# Patient Record
Sex: FEMALE | Race: BLACK OR AFRICAN AMERICAN | Employment: STUDENT | URBAN - METROPOLITAN AREA
[De-identification: names, ages, dates, MRNs, and addresses within clinical notes are randomized per-mention and may not be internally consistent; named-entity substitution may affect disease eponyms.]

---

## 2017-08-19 ENCOUNTER — HOSPITAL ENCOUNTER (EMERGENCY)
Facility: HOSPITAL | Age: 22
Discharge: HOME | End: 2017-08-19
Attending: EMERGENCY MEDICINE
Payer: MEDICAID

## 2017-08-19 ENCOUNTER — APPOINTMENT (OUTPATIENT)
Dept: ULTRASOUND IMAGING | Facility: HOSPITAL | Age: 22
End: 2017-08-19
Attending: EMERGENCY MEDICINE
Payer: MEDICAID

## 2017-08-19 VITALS
WEIGHT: 185 LBS | HEIGHT: 60 IN | HEART RATE: 88 BPM | BODY MASS INDEX: 36.32 KG/M2 | RESPIRATION RATE: 17 BRPM | SYSTOLIC BLOOD PRESSURE: 117 MMHG | OXYGEN SATURATION: 100 % | DIASTOLIC BLOOD PRESSURE: 71 MMHG | TEMPERATURE: 98 F

## 2017-08-19 DIAGNOSIS — R51.9 NONINTRACTABLE EPISODIC HEADACHE, UNSPECIFIED HEADACHE TYPE: ICD-10-CM

## 2017-08-19 DIAGNOSIS — Z3A.01 LESS THAN 8 WEEKS GESTATION OF PREGNANCY: Primary | ICD-10-CM

## 2017-08-19 LAB
ALBUMIN SERPL-MCNC: 3.4 G/DL (ref 3.5–4.8)
ALP LIVER SERPL-CCNC: 83 U/L (ref 52–144)
ALT SERPL-CCNC: 18 U/L (ref 14–54)
AST SERPL-CCNC: 10 U/L (ref 15–41)
BASOPHILS # BLD AUTO: 0.05 X10(3) UL (ref 0–0.1)
BASOPHILS NFR BLD AUTO: 0.6 %
BILIRUB SERPL-MCNC: 0.2 MG/DL (ref 0.1–2)
BILIRUB UR QL STRIP.AUTO: NEGATIVE
BUN BLD-MCNC: 7 MG/DL (ref 8–20)
CALCIUM BLD-MCNC: 9.3 MG/DL (ref 8.3–10.3)
CHLORIDE: 108 MMOL/L (ref 101–111)
CLARITY UR REFRACT.AUTO: CLEAR
CO2: 25 MMOL/L (ref 22–32)
COLOR UR AUTO: YELLOW
CREAT BLD-MCNC: 0.62 MG/DL (ref 0.55–1.02)
EOSINOPHIL # BLD AUTO: 0.17 X10(3) UL (ref 0–0.3)
EOSINOPHIL NFR BLD AUTO: 2 %
ERYTHROCYTE [DISTWIDTH] IN BLOOD BY AUTOMATED COUNT: 15.1 % (ref 11.5–16)
GLUCOSE BLD-MCNC: 78 MG/DL (ref 70–99)
GLUCOSE UR STRIP.AUTO-MCNC: NEGATIVE MG/DL
HCG QUANTITATIVE: 4652 MIU/ML (ref ?–1)
HCT VFR BLD AUTO: 33.6 % (ref 34–50)
HGB BLD-MCNC: 10.8 G/DL (ref 12–16)
IMMATURE GRANULOCYTE COUNT: 0.03 X10(3) UL (ref 0–1)
IMMATURE GRANULOCYTE RATIO %: 0.4 %
KETONES UR STRIP.AUTO-MCNC: NEGATIVE MG/DL
LYMPHOCYTES # BLD AUTO: 2.6 X10(3) UL (ref 0.9–4)
LYMPHOCYTES NFR BLD AUTO: 30.7 %
M PROTEIN MFR SERPL ELPH: 7.3 G/DL (ref 6.1–8.3)
MCH RBC QN AUTO: 26.2 PG (ref 27–33.2)
MCHC RBC AUTO-ENTMCNC: 32.1 G/DL (ref 31–37)
MCV RBC AUTO: 81.6 FL (ref 81–100)
MONOCYTES # BLD AUTO: 0.71 X10(3) UL (ref 0.1–0.6)
MONOCYTES NFR BLD AUTO: 8.4 %
NEUTROPHIL ABS PRELIM: 4.92 X10 (3) UL (ref 1.3–6.7)
NEUTROPHILS # BLD AUTO: 4.92 X10(3) UL (ref 1.3–6.7)
NEUTROPHILS NFR BLD AUTO: 57.9 %
NITRITE UR QL STRIP.AUTO: NEGATIVE
PH UR STRIP.AUTO: 5 [PH] (ref 4.5–8)
PLATELET # BLD AUTO: 362 10(3)UL (ref 150–450)
POCT LOT NUMBER: NORMAL
POCT URINE PREGNANCY: POSITIVE
POTASSIUM SERPL-SCNC: 3.7 MMOL/L (ref 3.6–5.1)
PROT UR STRIP.AUTO-MCNC: NEGATIVE MG/DL
RBC # BLD AUTO: 4.12 X10(6)UL (ref 3.8–5.1)
RBC UR QL AUTO: NEGATIVE
RED CELL DISTRIBUTION WIDTH-SD: 44.4 FL (ref 35.1–46.3)
RH BLOOD TYPE: POSITIVE
SODIUM SERPL-SCNC: 141 MMOL/L (ref 136–144)
SP GR UR STRIP.AUTO: 1.03 (ref 1–1.03)
UROBILINOGEN UR STRIP.AUTO-MCNC: <2 MG/DL
WBC # BLD AUTO: 8.5 X10(3) UL (ref 4–13)

## 2017-08-19 PROCEDURE — 80053 COMPREHEN METABOLIC PANEL: CPT | Performed by: EMERGENCY MEDICINE

## 2017-08-19 PROCEDURE — 87086 URINE CULTURE/COLONY COUNT: CPT | Performed by: EMERGENCY MEDICINE

## 2017-08-19 PROCEDURE — 86901 BLOOD TYPING SEROLOGIC RH(D): CPT | Performed by: EMERGENCY MEDICINE

## 2017-08-19 PROCEDURE — 99284 EMERGENCY DEPT VISIT MOD MDM: CPT

## 2017-08-19 PROCEDURE — 85025 COMPLETE CBC W/AUTO DIFF WBC: CPT | Performed by: EMERGENCY MEDICINE

## 2017-08-19 PROCEDURE — 86900 BLOOD TYPING SEROLOGIC ABO: CPT | Performed by: EMERGENCY MEDICINE

## 2017-08-19 PROCEDURE — 84702 CHORIONIC GONADOTROPIN TEST: CPT | Performed by: EMERGENCY MEDICINE

## 2017-08-19 PROCEDURE — 76817 TRANSVAGINAL US OBSTETRIC: CPT | Performed by: EMERGENCY MEDICINE

## 2017-08-19 PROCEDURE — 81001 URINALYSIS AUTO W/SCOPE: CPT | Performed by: EMERGENCY MEDICINE

## 2017-08-19 PROCEDURE — 96360 HYDRATION IV INFUSION INIT: CPT

## 2017-08-19 PROCEDURE — 76801 OB US < 14 WKS SINGLE FETUS: CPT | Performed by: EMERGENCY MEDICINE

## 2017-08-19 PROCEDURE — 81025 URINE PREGNANCY TEST: CPT

## 2017-08-19 RX ORDER — ACETAMINOPHEN 500 MG
1000 TABLET ORAL ONCE
Status: COMPLETED | OUTPATIENT
Start: 2017-08-19 | End: 2017-08-19

## 2017-08-19 NOTE — ED INITIAL ASSESSMENT (HPI)
Pt has had a ongoing headache, nausea, 1 episode of diarrhea, generalized abd pain - started 2 weeks ago  Pt states she hasn't taken pain meds for headache    Pt took home preg test and it came back yest - it was positive  Denies vaginal bleeding

## 2017-08-20 NOTE — ED NOTES
Pt received 800ml water at bs to drink - tolerating well and instructed to call with her full bladder.

## 2017-08-20 NOTE — ED PROVIDER NOTES
Patient Seen in: [facility] Emergency Department    History   Patient presents with:  Headache (neurologic)  Abdomen/Flank Pain (GI/)    Stated Complaint: Abdominal pain    HPI    4year-old female, [id number] resents emergency room with chief compla agreed except as otherwise stated in HPI.     Physical Exam   ED Triage Vitals  BP: 114/71 [08/19/17 1846]  Pulse: 106 [08/19/17 1846]  Resp: 18 [08/19/17 1846]  Temp: 98.1 °F (36.7 °C) [08/19/17 1846]  Temp src: Temporal [08/19/17 1846]  SpO2: 99 % [08/19/ Result Value    Leukocyte Esterase Urine Large (*)     WBC Urine 5-10 (*)     Squamous Epi.  Cells Large (*)     Mucous Urine 2+ (*)     All other components within normal limits   COMP METABOLIC PANEL (14) - Abnormal; Notable for the following:     BUN type    Disposition:  Discharge    Follow-up:  Dacia Hale MD  [address] [id number]    Schedule an appointment as soon as possible for a visit in 2 days        Medications Prescribed:  Current Discharge Medication

## 2017-09-17 ENCOUNTER — APPOINTMENT (OUTPATIENT)
Dept: ULTRASOUND IMAGING | Age: 22
End: 2017-09-17
Attending: PHYSICIAN ASSISTANT
Payer: MEDICAID

## 2017-09-17 ENCOUNTER — HOSPITAL ENCOUNTER (EMERGENCY)
Age: 22
Discharge: HOME | End: 2017-09-17
Attending: EMERGENCY MEDICINE
Payer: MEDICAID

## 2017-09-17 VITALS
WEIGHT: 195 LBS | HEIGHT: 64 IN | OXYGEN SATURATION: 96 % | TEMPERATURE: 99 F | SYSTOLIC BLOOD PRESSURE: 110 MMHG | DIASTOLIC BLOOD PRESSURE: 66 MMHG | BODY MASS INDEX: 33.29 KG/M2 | RESPIRATION RATE: 16 BRPM | HEART RATE: 71 BPM

## 2017-09-17 DIAGNOSIS — O20.9 VAGINAL BLEEDING IN PREGNANCY, FIRST TRIMESTER: Primary | ICD-10-CM

## 2017-09-17 LAB
BASOPHILS # BLD AUTO: 0.04 X10(3) UL (ref 0–0.1)
BASOPHILS NFR BLD AUTO: 0.5 %
EOSINOPHIL # BLD AUTO: 0.12 X10(3) UL (ref 0–0.3)
EOSINOPHIL NFR BLD AUTO: 1.4 %
ERYTHROCYTE [DISTWIDTH] IN BLOOD BY AUTOMATED COUNT: 14.4 % (ref 11.5–16)
HCG QUANTITATIVE: ABNORMAL MIU/ML (ref ?–1)
HCT VFR BLD AUTO: 34.1 % (ref 34–50)
HGB BLD-MCNC: 11.2 G/DL (ref 12–16)
IMMATURE GRANULOCYTE COUNT: 0.02 X10(3) UL (ref 0–1)
IMMATURE GRANULOCYTE RATIO %: 0.2 %
LYMPHOCYTES # BLD AUTO: 2.53 X10(3) UL (ref 0.9–4)
LYMPHOCYTES NFR BLD AUTO: 28.6 %
MCH RBC QN AUTO: 26.7 PG (ref 27–33.2)
MCHC RBC AUTO-ENTMCNC: 32.8 G/DL (ref 31–37)
MCV RBC AUTO: 81.2 FL (ref 81–100)
MONOCYTES # BLD AUTO: 0.51 X10(3) UL (ref 0.1–0.6)
MONOCYTES NFR BLD AUTO: 5.8 %
NEUTROPHIL ABS PRELIM: 5.62 X10 (3) UL (ref 1.3–6.7)
NEUTROPHILS # BLD AUTO: 5.62 X10(3) UL (ref 1.3–6.7)
NEUTROPHILS NFR BLD AUTO: 63.5 %
PLATELET # BLD AUTO: 379 10(3)UL (ref 150–450)
RBC # BLD AUTO: 4.2 X10(6)UL (ref 3.8–5.1)
RED CELL DISTRIBUTION WIDTH-SD: 42.2 FL (ref 35.1–46.3)
RH BLOOD TYPE: POSITIVE
WBC # BLD AUTO: 8.8 X10(3) UL (ref 4–13)

## 2017-09-17 PROCEDURE — 86901 BLOOD TYPING SEROLOGIC RH(D): CPT | Performed by: EMERGENCY MEDICINE

## 2017-09-17 PROCEDURE — 84702 CHORIONIC GONADOTROPIN TEST: CPT | Performed by: PHYSICIAN ASSISTANT

## 2017-09-17 PROCEDURE — 84702 CHORIONIC GONADOTROPIN TEST: CPT | Performed by: EMERGENCY MEDICINE

## 2017-09-17 PROCEDURE — 96360 HYDRATION IV INFUSION INIT: CPT

## 2017-09-17 PROCEDURE — 76801 OB US < 14 WKS SINGLE FETUS: CPT | Performed by: EMERGENCY MEDICINE

## 2017-09-17 PROCEDURE — 86900 BLOOD TYPING SEROLOGIC ABO: CPT | Performed by: EMERGENCY MEDICINE

## 2017-09-17 PROCEDURE — 85025 COMPLETE CBC W/AUTO DIFF WBC: CPT | Performed by: EMERGENCY MEDICINE

## 2017-09-17 PROCEDURE — 85025 COMPLETE CBC W/AUTO DIFF WBC: CPT | Performed by: PHYSICIAN ASSISTANT

## 2017-09-17 PROCEDURE — 99284 EMERGENCY DEPT VISIT MOD MDM: CPT

## 2017-09-18 NOTE — ED PROVIDER NOTES
Patient Seen in: [facility] In [place]    History   Patient presents with:  Eval-G (gynecologic)    Stated Complaint: Eval-G    HPI        Past Medical History:   Diagnosis Date   • Asthma        Past Surgical History:  No date: ROCÍO Abnormal            Final result                 Please view results for these tests on the individual orders.    ABORH (BLOOD TYPE)       ============================================================  ED Course  -------------------------------------

## 2017-09-18 NOTE — ED INITIAL ASSESSMENT (HPI)
Pt to ed co bright red vaginal bleeding noted earlier today pt has co mild cramps lower right side states she is 9 weeks 2 days preg last US done at [facility] on last week

## 2017-09-18 NOTE — ED PROVIDER NOTES
Patient Seen in: [facility] In [facility]    History   Patient presents with:  Eval-G (gynecologic)    Stated Complaint: Eval-G    AUGUSTUS Rodrigues is a 36-year-old female who presents today for evaluation of vaginal bleeding and pregnancy (Oral)   Resp 16   Ht 162.6 cm (5' 4\")   Wt 88.5 kg   LMP 07/03/2017   SpO2 96%   BMI 33.47 kg/m²         Physical Exam   Constitutional: She is oriented to person, place, and time. She appears well-developed and well-nourished. No distress.    HENT:   Naeem Michael W/ DIFFERENTIAL[[id number]]          Abnormal            Final result                 Please view results for these tests on the individual orders.    ABORH (BLOOD TYPE)       ============================================================  ED Course  --------- and normal appearing. YOLK SAC:  Not identified CARDIAC ACTIVITY:  Cardiac activity is identified at 171 bpm. UTERUS:  Normal.  RIGHT OVARY:  There is a simple appearing right ovarian cyst measuring 2.6 cm.  LEFT OVARY:  Normal. CUL-DE-SAC:  Normal. CLINICA

## 2017-09-21 ENCOUNTER — APPOINTMENT (OUTPATIENT)
Dept: ULTRASOUND IMAGING | Age: 22
End: 2017-09-21
Attending: EMERGENCY MEDICINE
Payer: MEDICAID

## 2017-09-21 ENCOUNTER — HOSPITAL ENCOUNTER (EMERGENCY)
Age: 22
Discharge: HOME | End: 2017-09-21
Attending: EMERGENCY MEDICINE
Payer: MEDICAID

## 2017-09-21 VITALS
HEIGHT: 64 IN | BODY MASS INDEX: 32.44 KG/M2 | RESPIRATION RATE: 18 BRPM | WEIGHT: 190 LBS | TEMPERATURE: 98 F | OXYGEN SATURATION: 99 % | DIASTOLIC BLOOD PRESSURE: 62 MMHG | HEART RATE: 94 BPM | SYSTOLIC BLOOD PRESSURE: 129 MMHG

## 2017-09-21 DIAGNOSIS — O20.9 VAGINAL BLEEDING IN PREGNANCY, FIRST TRIMESTER: Primary | ICD-10-CM

## 2017-09-21 LAB
BASOPHILS # BLD AUTO: 0.05 X10(3) UL (ref 0–0.1)
BASOPHILS NFR BLD AUTO: 0.6 %
EOSINOPHIL # BLD AUTO: 0.14 X10(3) UL (ref 0–0.3)
EOSINOPHIL NFR BLD AUTO: 1.6 %
ERYTHROCYTE [DISTWIDTH] IN BLOOD BY AUTOMATED COUNT: 14.2 % (ref 11.5–16)
HCG QUANTITATIVE: ABNORMAL MIU/ML (ref ?–1)
HCT VFR BLD AUTO: 31.4 % (ref 34–50)
HGB BLD-MCNC: 10.3 G/DL (ref 12–16)
IMMATURE GRANULOCYTE COUNT: 0.03 X10(3) UL (ref 0–1)
IMMATURE GRANULOCYTE RATIO %: 0.4 %
LYMPHOCYTES # BLD AUTO: 2.82 X10(3) UL (ref 0.9–4)
LYMPHOCYTES NFR BLD AUTO: 33 %
MCH RBC QN AUTO: 26.7 PG (ref 27–33.2)
MCHC RBC AUTO-ENTMCNC: 32.8 G/DL (ref 31–37)
MCV RBC AUTO: 81.3 FL (ref 81–100)
MONOCYTES # BLD AUTO: 0.53 X10(3) UL (ref 0.1–0.6)
MONOCYTES NFR BLD AUTO: 6.2 %
NEUTROPHIL ABS PRELIM: 4.97 X10 (3) UL (ref 1.3–6.7)
NEUTROPHILS # BLD AUTO: 4.97 X10(3) UL (ref 1.3–6.7)
NEUTROPHILS NFR BLD AUTO: 58.2 %
PLATELET # BLD AUTO: 351 10(3)UL (ref 150–450)
RBC # BLD AUTO: 3.86 X10(6)UL (ref 3.8–5.1)
RED CELL DISTRIBUTION WIDTH-SD: 41.8 FL (ref 35.1–46.3)
WBC # BLD AUTO: 8.5 X10(3) UL (ref 4–13)

## 2017-09-21 PROCEDURE — 99284 EMERGENCY DEPT VISIT MOD MDM: CPT

## 2017-09-21 PROCEDURE — 36415 COLL VENOUS BLD VENIPUNCTURE: CPT

## 2017-09-21 PROCEDURE — 84702 CHORIONIC GONADOTROPIN TEST: CPT | Performed by: EMERGENCY MEDICINE

## 2017-09-21 PROCEDURE — 85025 COMPLETE CBC W/AUTO DIFF WBC: CPT | Performed by: EMERGENCY MEDICINE

## 2017-09-22 NOTE — ED PROVIDER NOTES
Patient Seen in: [facility] Emergency Department In [place]    History   Patient presents with:  Eval-G (gynecologic)    Stated Complaint: Vaginal Bleeding-Approximately 2 months pregnant    AUGUSTUS Davidsno is a 71-year-old female who presents for the seco LMP 07/03/2017   SpO2 99%   BMI 32.61 kg/m²         Physical Exam   Constitutional: She is oriented to person, place, and time. She appears well-developed and well-nourished. No distress. HENT:   Head: Normocephalic and atraumatic.    Cardiovascular: Norm tests on the individual orders.      This case is discussed with Dr. Yanelis Whiting who evaluates the patient, performs a bedside ultrasound which shows live IUP with heartbeat and movement, and agrees with the plan of care.   ========================================

## 2017-09-22 NOTE — ED PROVIDER NOTES
Patient was seen in the emergency department on September 17. She was having vaginal bleeding at that time.   Ultrasound identified single live intrauterine pregnancy at about 9 weeks 3 days with a  simple appearing right ovarian cyst    Patient complains

## 2017-10-17 ENCOUNTER — HOSPITAL ENCOUNTER (EMERGENCY)
Age: 22
Discharge: HOME | End: 2017-10-17
Attending: EMERGENCY MEDICINE
Payer: MEDICAID

## 2017-10-17 ENCOUNTER — APPOINTMENT (OUTPATIENT)
Dept: ULTRASOUND IMAGING | Age: 22
End: 2017-10-17
Attending: PHYSICIAN ASSISTANT
Payer: MEDICAID

## 2017-10-17 VITALS
RESPIRATION RATE: 18 BRPM | HEART RATE: 87 BPM | WEIGHT: 185 LBS | BODY MASS INDEX: 31.58 KG/M2 | DIASTOLIC BLOOD PRESSURE: 55 MMHG | SYSTOLIC BLOOD PRESSURE: 105 MMHG | HEIGHT: 64 IN | OXYGEN SATURATION: 99 %

## 2017-10-17 DIAGNOSIS — L73.9 FOLLICULITIS: Primary | ICD-10-CM

## 2017-10-17 DIAGNOSIS — O46.92 VAGINAL BLEEDING IN PREGNANCY, SECOND TRIMESTER: ICD-10-CM

## 2017-10-17 PROCEDURE — 81015 MICROSCOPIC EXAM OF URINE: CPT | Performed by: PHYSICIAN ASSISTANT

## 2017-10-17 PROCEDURE — 76801 OB US < 14 WKS SINGLE FETUS: CPT | Performed by: PHYSICIAN ASSISTANT

## 2017-10-17 PROCEDURE — 76817 TRANSVAGINAL US OBSTETRIC: CPT | Performed by: PHYSICIAN ASSISTANT

## 2017-10-17 PROCEDURE — 87086 URINE CULTURE/COLONY COUNT: CPT | Performed by: PHYSICIAN ASSISTANT

## 2017-10-17 PROCEDURE — 81001 URINALYSIS AUTO W/SCOPE: CPT | Performed by: PHYSICIAN ASSISTANT

## 2017-10-17 PROCEDURE — 99284 EMERGENCY DEPT VISIT MOD MDM: CPT

## 2017-10-17 RX ORDER — CEPHALEXIN 500 MG/1
500 CAPSULE ORAL 3 TIMES DAILY
Qty: 21 CAPSULE | Refills: 0 | Status: SHIPPED | OUTPATIENT
Start: 2017-10-17 | End: 2017-10-24

## 2017-10-17 RX ORDER — FAMOTIDINE 20 MG
TABLET ORAL
COMMUNITY
End: 2019-08-06

## 2017-10-18 NOTE — ED INITIAL ASSESSMENT (HPI)
Patient states that she is approximately 14 weeks pregnant and has had some discharge with odor. Patient also states that she has a rash on her abdomen that started last weekend.

## 2017-10-18 NOTE — ED INITIAL ASSESSMENT (HPI)
Rash on abdomen for the last 10 days. Pt also reports she is 14 weeks pregnant and has been having bleeding throughout the pregnancy. PT has been seen in the ED a month ago for the same thing. Has not yet followed up with OBGYN.

## 2017-10-18 NOTE — ED PROVIDER NOTES
I have personally reviewed the case with the PA as well as completed a HPI and agree with the care and treatment plan put forth  Patient's ultrasound is showing a live IUP at 13 weeks 6 days. Patient's rash consistent with folliculitis.   Patient be discha

## 2017-10-18 NOTE — ED PROVIDER NOTES
Patient Seen in: [facility] Emergency Department In [place]    History   Patient presents with:  Eval-G (gynecologic)  Rash Skin Problem (integumentary)    Stated Complaint: Vaginal Bleeding approximately 14 weeks pregnant with rash on abdomen    HPI    J Exam   ED Triage Vitals [10/17/17 1930]  BP: 121/68  Pulse: 91  Resp: 18  Temp: n/a  Temp src: Oral  SpO2: 100 %  O2 Device: None (Room air)    Current:/68   Pulse 91   Resp 18   Ht 162.6 cm (5' 4\")   Wt 83.9 kg   LMP 07/03/2017   SpO2 100%   BMI 31 today and remains so upon discharge. I discuss the plan of care with the patient, who expresses understanding. All questions and concerns are addressed to the patient's satisfaction prior to discharge today.     Disposition and Plan     Clinical Impressio

## 2017-12-20 ENCOUNTER — HOSPITAL ENCOUNTER (EMERGENCY)
Age: 22
Discharge: LEFT BEFORE DISCHARGE | End: 2017-12-20
Attending: EMERGENCY MEDICINE
Payer: MEDICAID

## 2017-12-20 VITALS
BODY MASS INDEX: 33.29 KG/M2 | TEMPERATURE: 98 F | RESPIRATION RATE: 16 BRPM | OXYGEN SATURATION: 100 % | HEART RATE: 91 BPM | WEIGHT: 195 LBS | HEIGHT: 64 IN | DIASTOLIC BLOOD PRESSURE: 48 MMHG | SYSTOLIC BLOOD PRESSURE: 101 MMHG

## 2017-12-20 DIAGNOSIS — O26.892 ABDOMINAL PAIN IN PREGNANCY, SECOND TRIMESTER: Primary | ICD-10-CM

## 2017-12-20 DIAGNOSIS — R10.9 ABDOMINAL PAIN IN PREGNANCY, SECOND TRIMESTER: Primary | ICD-10-CM

## 2017-12-20 PROCEDURE — 99283 EMERGENCY DEPT VISIT LOW MDM: CPT

## 2017-12-20 NOTE — ED PROVIDER NOTES
Patient Seen in: [facility] Emergency Department In [place]    History   Patient presents with:  Eval-G (gynecologic)    Stated Complaint: Abd pain- Eval G +preg    HPI    20-year-old female who is  2 para 1 and approximately 23 weeks pregnant pres Oropharynx is clear and moist.   Eyes: EOM are normal. Pupils are equal, round, and reactive to light. Neck: Normal range of motion. Neck supple. Cardiovascular: Normal rate, regular rhythm, normal heart sounds and intact distal pulses.     Pulmonary/Ch Patient understands and again states that she is not driving that far and does not want to travel that far. We offered to have her go by ambulance and she refused this as well.   She subsequently signed out [address]          Disposition and P

## 2017-12-20 NOTE — ED INITIAL ASSESSMENT (HPI)
Pt is 23 weeks pregnant. Sts she has been having constant abd pain x 1 month. Denies any vaginal bleeding.  LMP 7/24, KUNAL 4/16

## 2017-12-21 NOTE — ED NOTES
Spoke to patient about transferring to [facility] because [facility] is full. Pt states, \"I am not going to [facility] it is too far\". RN informed patient that she will be signing out AMA. Pt verbalized understanding. Dr Mortimer Riff spoke to patient also.

## 2017-12-21 NOTE — ED NOTES
DR. Tiki Cortez [facility]PO[facility] DR[facility]

## 2017-12-21 NOTE — CM/SW NOTE
Received call from [facility] that patient will need transfer. Patient has out of [employer] and needs transfer for admission. Patient is 23 weeks pregnant and is a patient of Dr. Brayan Mcdonnell from the [facility].   Called Dr. Abbe Chino at [phone] x [phone]

## 2017-12-21 NOTE — ED NOTES
RN encouraged patient to call OB tonight to try and see them in the am. Pt told RN \"I didn't call because I can't get through\" Pt dc by BRIANNA

## 2019-08-06 ENCOUNTER — OFFICE VISIT (OUTPATIENT)
Dept: FAMILY MEDICINE CLINIC | Facility: CLINIC | Age: 24
End: 2019-08-06
Payer: COMMERCIAL

## 2019-08-06 VITALS
BODY MASS INDEX: 37.17 KG/M2 | OXYGEN SATURATION: 98 % | WEIGHT: 225.81 LBS | SYSTOLIC BLOOD PRESSURE: 122 MMHG | HEART RATE: 81 BPM | RESPIRATION RATE: 18 BRPM | TEMPERATURE: 99 F | HEIGHT: 65.5 IN | DIASTOLIC BLOOD PRESSURE: 78 MMHG

## 2019-08-06 DIAGNOSIS — H10.022 OTHER MUCOPURULENT CONJUNCTIVITIS OF LEFT EYE: Primary | ICD-10-CM

## 2019-08-06 PROCEDURE — 99202 OFFICE O/P NEW SF 15 MIN: CPT | Performed by: NURSE PRACTITIONER

## 2019-08-06 RX ORDER — POLYMYXIN B SULFATE AND TRIMETHOPRIM 1; 10000 MG/ML; [USP'U]/ML
1 SOLUTION OPHTHALMIC EVERY 6 HOURS
Qty: 1 BOTTLE | Refills: 0 | Status: SHIPPED | OUTPATIENT
Start: 2019-08-06 | End: 2019-08-13

## 2019-08-06 NOTE — PROGRESS NOTES
CHIEF COMPLAINT:   Patient presents with:  Eye Problem: left sided, itching/pain/swelling/watering (exposure to pink eye) - Started early this morning      HPI:   Nay Dhaliwal is a 26year old female who presents with chief complaint of eye irritation. GENERAL: well developed, well nourished,in no apparent distress  SKIN: no rashes,no suspicious lesions  EYES: PERRLA, EOMI, left conjunctiva erythematous, injected, no active discharge.   HENT: atraumatic, normocephalic, TMs non injected, without bulging or The membrane that covers the white part of your eye (the conjunctiva) is inflamed. Inflammation happens when your body responds to an injury, allergic reaction, infection, or illness.  Symptoms of inflammation in the eye may include redness, irritation, itc

## 2024-03-03 ENCOUNTER — HOSPITAL ENCOUNTER (OUTPATIENT)
Age: 29
Discharge: ANOTHER HOSPITAL | End: 2024-03-03
Payer: MEDICAID

## 2024-03-03 VITALS
TEMPERATURE: 97 F | SYSTOLIC BLOOD PRESSURE: 109 MMHG | DIASTOLIC BLOOD PRESSURE: 57 MMHG | RESPIRATION RATE: 18 BRPM | OXYGEN SATURATION: 98 % | HEART RATE: 84 BPM

## 2024-03-03 DIAGNOSIS — R10.9 ABDOMINAL CRAMPING: Primary | ICD-10-CM

## 2024-03-03 DIAGNOSIS — Z32.01 POSITIVE PREGNANCY TEST (HCC): ICD-10-CM

## 2024-03-03 LAB
B-HCG UR QL: POSITIVE
BILIRUB UR QL STRIP: NEGATIVE
CLARITY UR: CLEAR
GLUCOSE UR STRIP-MCNC: NEGATIVE MG/DL
HGB UR QL STRIP: NEGATIVE
LEUKOCYTE ESTERASE UR QL STRIP: NEGATIVE
NITRITE UR QL STRIP: NEGATIVE
PH UR STRIP: 7 [PH]
PROT UR STRIP-MCNC: 30 MG/DL
SP GR UR STRIP: 1.02
UROBILINOGEN UR STRIP-ACNC: <2 MG/DL

## 2024-03-03 PROCEDURE — 81002 URINALYSIS NONAUTO W/O SCOPE: CPT

## 2024-03-03 PROCEDURE — 99203 OFFICE O/P NEW LOW 30 MIN: CPT

## 2024-03-03 PROCEDURE — 99202 OFFICE O/P NEW SF 15 MIN: CPT

## 2024-03-03 PROCEDURE — 81025 URINE PREGNANCY TEST: CPT

## 2024-03-03 NOTE — ED INITIAL ASSESSMENT (HPI)
Vaginal discharge with low abd cramps since last night, pt had + pregnancy test 2 weeks ago, no prenatal yet, no fever, no urinary symptoms

## 2024-03-03 NOTE — ED PROVIDER NOTES
Patient Seen in: [facility]      History     Chief Complaint   Patient presents with    Eval-G     Stated Complaint: Yeast Infection  Subjective:   28-year-old female presents for vaginal discharge, discomfort, and abdominal cramping.  She states this has been going on for the past few days.  Her last menses was .  She states she did take a home pregnancy test a couple weeks ago that was positive.  She has not sought out prenatal care yet.  She is a  4 para 2.  She is eating and drinking without vomiting or diarrhea.  She states she has a history of frequent BV infections.  No fevers.  She appears nontoxic.      HISTORY:  Past Medical History:   Diagnosis Date    Asthma (HCC)       Past Surgical History:   Procedure Laterality Date     DELIVERY ONLY        Family History   Problem Relation Age of Onset    Cancer Neg     Heart Disorder Neg       Social History     Socioeconomic History    Marital status: Single   Tobacco Use    Smoking status: Former     Types: Cigarettes    Smokeless tobacco: Former   Substance and Sexual Activity    Alcohol use: No           Review of Systems   Genitourinary:  Positive for pelvic pain, vaginal discharge and vaginal pain.   All other systems reviewed and are negative.      Positive for stated complaint: Yeast Infection  Other systems are as noted in HPI.  Constitutional and vital signs reviewed.      All other systems reviewed and negative except as noted above.    Physical Exam     ED Triage Vitals [24 0926]   /57   Pulse 84   Resp 18   Temp 96.8 °F (36 °C)   Temp src Temporal   SpO2 98 %   O2 Device      Current:/57   Pulse 84   Temp 96.8 °F (36 °C) (Temporal)   Resp 18   LMP 2023 (Exact Date)   SpO2 98%     Physical Exam  Vitals and nursing note reviewed.   Constitutional:       General: She is not in acute distress.     Appearance: Normal appearance. She is not toxic-appearing.   HENT:      Nose: Nose normal.       Mouth/Throat:      Mouth: Mucous membranes are moist.   Cardiovascular:      Rate and Rhythm: Normal rate and regular rhythm.   Pulmonary:      Effort: Pulmonary effort is normal.      Breath sounds: Normal breath sounds.   Abdominal:      General: There is no distension.      Palpations: Abdomen is soft. There is no mass.      Tenderness: There is abdominal tenderness. There is no right CVA tenderness or left CVA tenderness.   Musculoskeletal:         General: Normal range of motion.   Skin:     General: Skin is warm and dry.      Capillary Refill: Capillary refill takes less than 2 seconds.   Neurological:      General: No focal deficit present.      Mental Status: She is alert and oriented to person, place, and time.   Psychiatric:         Mood and Affect: Mood normal.         Behavior: Behavior normal.         ED Course     Labs Reviewed   POCT PREGNANCY URINE - Abnormal; Notable for the following components:       Result Value    POCT Urine Pregnancy Positive (*)     All other components within normal limits   [facility] POCT URINALYSIS DIPSTICK - Abnormal; Notable for the following components:    Protein urine 30 (*)     Ketone, Urine Trace (*)     All other components within normal limits         MDM       Medical Decision Making  Based on the patient having abdominal cramping with a positive pregnancy test, she needs a further workup in the emergency department.  She agrees with plan of care and will go to the emergency department at [place].    Amount and/or Complexity of Data Reviewed  Labs: ordered.     Details: Urine dip shows 30 of protein, trace ketones, and her pregnancy test is positive.  Discussion of management or test interpretation with external provider(s): I discussed this patient with Dr. Reid. He agrees with the plan of care.         Disposition and Plan     Clinical Impression:  1. Abdominal cramping    2. Positive pregnancy test (HCC)         Disposition:  Ic to ed  3/3/2024 10:03  am    Follow-up:  No follow-up provider specified.        Medications Prescribed:  There are no discharge medications for this patient.

## 2024-03-03 NOTE — ED QUICK NOTES
Seen and examined by jazzmine carlin, pt will go to ed for further eval and management,pt denies vaginal bleeding

## 2024-03-26 ENCOUNTER — OFFICE VISIT (OUTPATIENT)
Dept: FAMILY MEDICINE CLINIC | Facility: CLINIC | Age: 29
End: 2024-03-26
Payer: MEDICAID

## 2024-03-26 VITALS
TEMPERATURE: 97 F | OXYGEN SATURATION: 98 % | RESPIRATION RATE: 18 BRPM | HEIGHT: 64 IN | BODY MASS INDEX: 38.58 KG/M2 | SYSTOLIC BLOOD PRESSURE: 128 MMHG | WEIGHT: 226 LBS | HEART RATE: 101 BPM | DIASTOLIC BLOOD PRESSURE: 70 MMHG

## 2024-03-26 DIAGNOSIS — Z11.3 ROUTINE SCREENING FOR STI (SEXUALLY TRANSMITTED INFECTION): ICD-10-CM

## 2024-03-26 DIAGNOSIS — Z98.890 STATUS POST ELECTIVE ABORTION: ICD-10-CM

## 2024-03-26 DIAGNOSIS — N94.9 VAGINAL SYMPTOM: Primary | ICD-10-CM

## 2024-03-26 LAB
APPEARANCE: CLEAR
BILIRUBIN: NEGATIVE
CONTROL LINE PRESENT WITH A CLEAR BACKGROUND (YES/NO): YES YES/NO
GLUCOSE (URINE DIPSTICK): NEGATIVE MG/DL
KETONES (URINE DIPSTICK): NEGATIVE MG/DL
KIT LOT #: NORMAL NUMERIC
LEUKOCYTES: NEGATIVE
MULTISTIX LOT#: NORMAL NUMERIC
NITRITE, URINE: NEGATIVE
OCCULT BLOOD: NEGATIVE
PH, URINE: 6 (ref 4.5–8)
PROTEIN (URINE DIPSTICK): NEGATIVE MG/DL
SPECIFIC GRAVITY: 1.02 (ref 1–1.03)
URINE-COLOR: YELLOW
UROBILINOGEN,SEMI-QN: 0.2 MG/DL (ref 0–1.9)

## 2024-03-26 PROCEDURE — 81514 NFCT DS BV&VAGINITIS DNA ALG: CPT | Performed by: NURSE PRACTITIONER

## 2024-03-26 PROCEDURE — 87591 N.GONORRHOEAE DNA AMP PROB: CPT | Performed by: NURSE PRACTITIONER

## 2024-03-26 PROCEDURE — 81003 URINALYSIS AUTO W/O SCOPE: CPT | Performed by: NURSE PRACTITIONER

## 2024-03-26 PROCEDURE — 87491 CHLMYD TRACH DNA AMP PROBE: CPT | Performed by: NURSE PRACTITIONER

## 2024-03-26 PROCEDURE — 81025 URINE PREGNANCY TEST: CPT | Performed by: NURSE PRACTITIONER

## 2024-03-26 PROCEDURE — 99203 OFFICE O/P NEW LOW 30 MIN: CPT | Performed by: NURSE PRACTITIONER

## 2024-03-26 RX ORDER — ALBUTEROL SULFATE 90 UG/1
2 AEROSOL, METERED RESPIRATORY (INHALATION)
COMMUNITY
Start: 2023-09-07 | End: 2025-01-31

## 2024-03-26 RX ORDER — ALBUTEROL SULFATE 2.5 MG/3ML
2.5 SOLUTION RESPIRATORY (INHALATION)
COMMUNITY
Start: 2024-02-01 | End: 2025-01-31

## 2024-03-26 NOTE — PROGRESS NOTES
CHIEF COMPLAINT:     Chief Complaint   Patient presents with    UTI     I believe I pay have a uti or a yeast infection or possibly BV cause I do tend to get it and I would also like a std screening done - Entered by patient  Sx: irritation, itching, discharge       HPI:   Migdalia Mo is a 28 year old female who presents for vaginal symptoms that started today.  States she has a history of BV and yeast infections.  Also requesting STI screening but doubts symptoms are STI related.    Pt complains of vaginal irritation, itching, and discharge.  Reports vaginal d/c that somewhat thin, whitish and cloudy and has no odor.  Had similar symptoms 3 weeks ago.  Was given terconazole inserts without testing since she was pregnant at the time.  However, she had an elective  2 weeks ago. Was suppose to have follow up exam today but it was canceled and rescheduled for next week. No abdominal pain or pelvic pain. No spotting or bleeding.      Denies urinary frequency, urgency, or burning with urination.    Pt is sexually active, reports no new partners in last 6 months. Same partner for 1 year.    Has been sexually active since .  Denies dyspareunia but was sore the next day  She reports No condom use.      + previous STD hx. 2015 - trich.      No current outpatient medications on file.      Past Medical History:   Diagnosis Date    Asthma (HCC)       Past Surgical History:   Procedure Laterality Date     DELIVERY ONLY        Family History   Problem Relation Age of Onset    Cancer Neg     Heart Disorder Neg       Social History:   Social History     Socioeconomic History    Marital status: Single   Tobacco Use    Smoking status: Former     Types: Cigarettes    Smokeless tobacco: Former   Substance and Sexual Activity    Alcohol use: No        REVIEW OF SYSTEMS:   GENERAL: no fatigue, fever, chills.  SKIN: denies any unusual skin lesions  EYES:denies blurred vision or double vision  HEENT: denies  nasal congestion, sinus pain or ST  LUNGS: denies shortness of breath with exertion  CARDIOVASCULAR: denies chest pain on exertion  GI: denies abdominal pain,denies heartburn  : as above  MUSCULOSKELETAL: denies back pain    EXAM:   /70   Pulse 101   Temp 97.4 °F (36.3 °C)   Resp 18   Ht 5' 4\" (1.626 m)   Wt 226 lb (102.5 kg)   LMP 12/23/2023 (Exact Date)   SpO2 98%   BMI 38.79 kg/m²   Body mass index is 38.79 kg/m².   GENERAL: well developed, well nourished,in no apparent distress,  SKIN: no rashes,no suspicious lesions  LUNGS: clear to auscultation, no W/R/R  CARDIO: RRR without murmur  GI: BS normoactive x 4, soft, + generalized tenderness, no masses, no HSM.  :introitus is normal, scant discharge - tan, cervix is pink and without lesion.  Cultures obtained.     Recent Results (from the past 24 hour(s))   Urine Dip, auto without Micro    Collection Time: 03/26/24  4:51 PM   Result Value Ref Range    Glucose Urine Negative Negative mg/dL    Bilirubin Urine Negative Negative    Ketones, UA Negative Negative - Trace mg/dL    Spec Gravity 1.025 1.005 - 1.030    Blood Urine Negative Negative    PH Urine 6.0 5.0 - 8.0    Protein Urine Negative Negative - Trace mg/dL    Urobilinogen Urine 0.2 0.2 - 1.0 mg/dL    Nitrite Urine Negative Negative    Leukocyte Esterase Urine Negative Negative    APPEARANCE Clear Clear    Color Urine Yellow Yellow    Multistix Lot# [id number] Numeric    Multistix Expiration Date 08- Date   Urine Pregnancy Test    Collection Time: 03/26/24  5:18 PM   Result Value Ref Range    Pregnancy Test, Urine faint positive     Control Line Present with a clear background (yes/no) yes Yes/No    Kit Lot # [id number] Numeric    Kit Expiration Date 04/08/2025 Date         ASSESSMENT AND PLAN:   Migdalia Mo is a 28 year old female who presents with     ASSESSMENT:  Encounter Diagnoses   Name Primary?    Vaginal symptom Yes    Routine screening for STI (sexually transmitted infection)      Status post elective         PLAN:   GC/CT and vaginitis panel done. Will treat according to symptoms.  Patient prefers oral tx.  Comfort care as listed in patient instructions.   Medication as below.    Requested Prescriptions      No prescriptions requested or ordered in this encounter       Risks, benefits, side effects of medication explained and discussed.     Follow in IC up if symptoms are no better in 2-3 days, sooner if symptoms worsen.  The patient indicates understanding of these issues and agrees to the plan.    There are no Patient Instructions on file for this visit.

## 2024-03-27 LAB
BV BACTERIA DNA VAG QL NAA+PROBE: NEGATIVE
C GLABRATA DNA VAG QL NAA+PROBE: NEGATIVE
C KRUSEI DNA VAG QL NAA+PROBE: NEGATIVE
C TRACH DNA SPEC QL NAA+PROBE: NEGATIVE
CANDIDA DNA VAG QL NAA+PROBE: NEGATIVE
N GONORRHOEA DNA SPEC QL NAA+PROBE: NEGATIVE
T VAGINALIS DNA VAG QL NAA+PROBE: NEGATIVE

## 2025-07-01 ENCOUNTER — HOSPITAL ENCOUNTER (EMERGENCY)
Age: 30
Discharge: HOME | End: 2025-07-01
Payer: MEDICAID

## 2025-07-01 ENCOUNTER — APPOINTMENT (OUTPATIENT)
Dept: GENERAL RADIOLOGY | Age: 30
End: 2025-07-01
Attending: PHYSICIAN ASSISTANT
Payer: MEDICAID

## 2025-07-01 VITALS
HEART RATE: 90 BPM | DIASTOLIC BLOOD PRESSURE: 72 MMHG | OXYGEN SATURATION: 97 % | TEMPERATURE: 98 F | BODY MASS INDEX: 37.56 KG/M2 | SYSTOLIC BLOOD PRESSURE: 117 MMHG | WEIGHT: 220 LBS | HEIGHT: 64 IN | RESPIRATION RATE: 18 BRPM

## 2025-07-01 DIAGNOSIS — M25.532 LEFT WRIST PAIN: Primary | ICD-10-CM

## 2025-07-01 DIAGNOSIS — N89.8 VAGINAL DISCHARGE: ICD-10-CM

## 2025-07-01 PROCEDURE — 87591 N.GONORRHOEAE DNA AMP PROB: CPT | Performed by: PHYSICIAN ASSISTANT

## 2025-07-01 PROCEDURE — 87491 CHLMYD TRACH DNA AMP PROBE: CPT | Performed by: PHYSICIAN ASSISTANT

## 2025-07-01 PROCEDURE — 81514 NFCT DS BV&VAGINITIS DNA ALG: CPT | Performed by: PHYSICIAN ASSISTANT

## 2025-07-01 PROCEDURE — 99284 EMERGENCY DEPT VISIT MOD MDM: CPT

## 2025-07-01 PROCEDURE — 73110 X-RAY EXAM OF WRIST: CPT | Performed by: PHYSICIAN ASSISTANT

## 2025-07-01 RX ORDER — METRONIDAZOLE 500 MG/1
500 TABLET ORAL 2 TIMES DAILY
Qty: 14 TABLET | Refills: 0 | Status: SHIPPED | OUTPATIENT
Start: 2025-07-01 | End: 2025-07-08

## 2025-07-01 RX ORDER — NAPROXEN 500 MG/1
500 TABLET ORAL 2 TIMES DAILY PRN
Qty: 20 TABLET | Refills: 0 | Status: SHIPPED | OUTPATIENT
Start: 2025-07-01 | End: 2025-07-08

## 2025-07-01 RX ORDER — FERROUS SULFATE 325(65) MG
325 TABLET, DELAYED RELEASE (ENTERIC COATED) ORAL 2 TIMES DAILY WITH MEALS
COMMUNITY

## 2025-07-01 NOTE — DISCHARGE INSTRUCTIONS
Naproxen twice daily for pain and inflammation.  Wear wrist splint.  Orthopedic follow-up for failure to improve.  Take the oral antibiotic as written.  If culture results dictate a change in treatment you will be contacted.

## 2025-07-01 NOTE — ED INITIAL ASSESSMENT (HPI)
Patient here with left wrist pain over the past three weeks. Denies trauma. Also complains of vaginal discharge and discomfort. Denies dysuria.

## 2025-07-01 NOTE — ED PROVIDER NOTES
Patient Seen in: [facility]        History  Chief Complaint   Patient presents with    Wrist Pain    Vaginal Discharge     Stated Complaint: Left Wrist Pain    Subjective:   HPI            29-year-old female.  Patient arrives to the ER for evaluation of 2 separate complaints.  For the last 3 weeks, patient has had a pain to her left wrist.  Specifically along her left thumb and snuffbox.  No good explanation for this pain.  No recurrent activities.  She has been off work recently.  There is no trauma or history of similar occurrences.    Secondarily, patient noted a new vaginal discharge in last 24 hours.  She had intercourse last night and it was uncomfortable.  Denies any back pain or flank pain.  No rash or lesions.          Objective:     Past Medical History:    Asthma (HCC)              Past Surgical History:   Procedure Laterality Date     delivery only                  Social History     Socioeconomic History    Marital status: Single   Tobacco Use    Smoking status: Former     Types: Cigarettes    Smokeless tobacco: Former   Substance and Sexual Activity    Alcohol use: No     Social Drivers of Health     Food Insecurity: Low Risk  (2025)    Received from [facility] Food Security     Within the past 12 months, the food you bought just didn't last and you didn't have money to get more.: 3     Within the past 12 months, you worried that your food would run out before you got money to buy more.: 3   Transportation Needs: Not At Risk (2025)    Received from [facility] Transportation Needs     In the past 12 months, has lack of reliable transportation kept you from medical appointments, meetings, work or from getting things needed for daily living?: No   Stress: Not at Risk (2024)    Received from [facility]    Stress     Do you feel these kinds of stress these days?: 1   Housing Stability: Not At Risk (2025)    Received from [facility]     [facility] Housing     What is your living situation today?: I have a steady place to live     Think about the place you live. Do you have problems with any of the following?: None of the above                                Physical Exam    ED Triage Vitals [07/01/25 1106]   /72   Pulse 90   Resp 18   Temp 98.2 °F (36.8 °C)   Temp src Oral   SpO2 97 %   O2 Device None (Room air)       Current Vitals:   Vital Signs  BP: 117/72  Pulse: 90  Resp: 18  Temp: 98.2 °F (36.8 °C)  Temp src: Oral    Oxygen Therapy  SpO2: 97 %  O2 Device: None (Room air)            Physical Exam     Gen: Well appearing, well groomed, alert and aware x 3  Neck: Supple, full range of motion  Eye examination: EOMs are intact, normal conjunctival  ENT: Atraumatic  Lung: No distress, RR, no retraction  Extremities: Snuffbox pain to palpation.  Axial load elicits similar pain.  Pain to palpation over the extensor tendons.  No erythema, induration or warmth  Pelvic: With the female nursing chaperone a pelvic exam was performed.  No rash or lesions.  Within the vault there is a thin drainage with frothing.  No cervical motion tenderness.  No sloughing tissue.  No friable tissue.  Back: Full range of motion  Skin: No sign of trauma, Skin warm and dry, no induration or sign of infection.         ED Course  Labs Reviewed   VAGINITIS VAGINOSIS PCR PANEL   CHLAMYDIA/GONOCOCCUS, TREY           XR WRIST COMPLETE (MIN 3 VIEWS), LEFT (CPT=73110)  Result Date: 7/1/2025  PROCEDURE: XR WRIST COMPLETE (MIN 3 VIEWS), LEFT (CPT=73110) INDICATIONS: Left Wrist Pain PATIENT STATED HISTORY: Pt complains of L wrist pain on the lateral aspect for 3 weeks. She denies any injury. No swelling or bruising noted. COMPARISON: There are no comparisons for this exam.     CONCLUSION: Negative for fracture or malalignment. Normal mineralization. Joint spaces are preserved. No periarticular erosions. Normal soft tissues Electronically Verified and Signed by Attending Radiologist:  Susy Lobo MD 7/1/2025 12:39 PM Workstation: [id number]              MDM         With the female nursing chaperone a pelvic exam was performed.  No rash or lesions.  Within the vault there is a thin drainage with frothing.  No cervical motion tenderness.  No sloughing tissue.  No friable tissue.    Highly suggestive of BV.  She has a history of recurrent BV.  Will  initiate Flagyl    Patient had to leave prior to a formal read of the wrist x-ray.  Self read demonstrates no acute bony abnormality      Naproxen twice daily for pain and inflammation.  Wear wrist splint.  Orthopedic follow-up for failure to improve.  Take the oral antibiotic as written.  If culture results dictate a change in treatment you will be contacted    CONCLUSION: Negative for fracture or malalignment. Normal mineralization. Joint spaces are preserved. No periarticular erosions. Normal soft tissues Electronically Verified and Signed by Attending Radiologist: Susy Lobo MD 7/1/2025 12:39 PM Workstation: [id number]    Patient placed on oral Flagyl empirically.  Cultures pending.  Velcro wrist splint applied.  Naproxen.  Referral to orthopedic specialty.  Medical Decision Making      Disposition and Plan     Clinical Impression:  1. Left wrist pain    2. Vaginal discharge         Disposition:  Discharge  7/1/2025 12:49 pm    Follow-up:  Roman Velez, PA  [address] [place] [address] [id number] [phone]    Follow up            Medications Prescribed:  Current Discharge Medication List        START taking these medications    Details   metroNIDAZOLE 500 MG Oral Tab Take 1 tablet (500 mg total) by mouth in the morning and 1 tablet (500 mg total) before bedtime. Do all this for 7 days.  Qty: 14 tablet, Refills: 0      naproxen 500 MG Oral Tab Take 1 tablet (500 mg total) by mouth 2 (two) times daily as needed.  Qty: 20 tablet, Refills: 0                   Supplementary Documentation:

## 2025-07-02 LAB
C TRACH DNA SPEC QL NAA+PROBE: NEGATIVE
N GONORRHOEA DNA SPEC QL NAA+PROBE: NEGATIVE

## 2025-07-03 LAB
BV BACTERIA DNA VAG QL NAA+PROBE: NEGATIVE
C GLABRATA DNA VAG QL NAA+PROBE: NEGATIVE
C KRUSEI DNA VAG QL NAA+PROBE: NEGATIVE
CANDIDA DNA VAG QL NAA+PROBE: POSITIVE
T VAGINALIS DNA VAG QL NAA+PROBE: NEGATIVE

## 2025-07-03 RX ORDER — FLUCONAZOLE 150 MG/1
150 TABLET ORAL ONCE
Qty: 1 TABLET | Refills: 0 | Status: SHIPPED | OUTPATIENT
Start: 2025-07-03 | End: 2025-07-03

## 2025-07-03 NOTE — ED NOTES
Pt called after looking up her results and additional meds were ordered.  No further action needed at this time

## 2025-08-19 ENCOUNTER — HOSPITAL ENCOUNTER (EMERGENCY)
Age: 30
Discharge: HOME | End: 2025-08-19
Attending: EMERGENCY MEDICINE

## 2025-08-19 VITALS
HEART RATE: 80 BPM | TEMPERATURE: 97 F | RESPIRATION RATE: 16 BRPM | SYSTOLIC BLOOD PRESSURE: 104 MMHG | HEIGHT: 65 IN | WEIGHT: 220 LBS | OXYGEN SATURATION: 98 % | BODY MASS INDEX: 36.65 KG/M2 | DIASTOLIC BLOOD PRESSURE: 54 MMHG

## 2025-08-19 DIAGNOSIS — M54.50 LOW BACK PAIN AT MULTIPLE SITES: Primary | ICD-10-CM

## 2025-08-19 LAB — B-HCG UR QL: NEGATIVE

## 2025-08-19 PROCEDURE — 99283 EMERGENCY DEPT VISIT LOW MDM: CPT

## 2025-08-19 PROCEDURE — 81025 URINE PREGNANCY TEST: CPT

## 2025-08-19 PROCEDURE — 96372 THER/PROPH/DIAG INJ SC/IM: CPT

## 2025-08-19 RX ORDER — KETOROLAC TROMETHAMINE 30 MG/ML
30 INJECTION, SOLUTION INTRAMUSCULAR; INTRAVENOUS ONCE
Status: COMPLETED | OUTPATIENT
Start: 2025-08-19 | End: 2025-08-19

## 2025-08-19 RX ORDER — IBUPROFEN 600 MG/1
600 TABLET, FILM COATED ORAL EVERY 8 HOURS PRN
Qty: 30 TABLET | Refills: 0 | Status: SHIPPED | OUTPATIENT
Start: 2025-08-19 | End: 2025-08-26

## 2025-08-19 RX ORDER — CYCLOBENZAPRINE HCL 10 MG
10 TABLET ORAL ONCE
Status: COMPLETED | OUTPATIENT
Start: 2025-08-19 | End: 2025-08-19

## 2025-08-19 RX ORDER — CYCLOBENZAPRINE HCL 10 MG
10 TABLET ORAL 3 TIMES DAILY PRN
Qty: 20 TABLET | Refills: 0 | Status: SHIPPED | OUTPATIENT
Start: 2025-08-19 | End: 2025-08-26

## 2025-08-19 RX ORDER — METHYLPREDNISOLONE 4 MG/1
TABLET ORAL
Qty: 1 EACH | Refills: 0 | Status: SHIPPED | OUTPATIENT
Start: 2025-08-19

## (undated) NOTE — ED AVS SNAPSHOT
Eldon London   MRN: [id number]    Department:  THE [facility] Emergency Department in [place]   Date of Visit:  12/20/2017           Disclosure     Insurance plans vary and the physician(s) referred by the ER may not be covered by your plan.  Please cont tell this physician (or your personal doctor if your instructions are to return to your personal doctor) about any new or lasting problems. The primary care or specialist physician will see patients referred from the [facility] Emergency Department.  Jeanine Patel

## (undated) NOTE — ED AVS SNAPSHOT
Martha Li   MRN: [id number]    Department:  [facility] Emergency Department   Date of Visit:  8/19/2017           Disclosure     Insurance plans vary and the physician(s) referred by the ER may not be covered by your plan.  Please contact yo If you have been prescribed any medication(s), please fill your prescription right away and begin taking the medication(s) as directed    If the emergency physician has read X-rays, these will be re-interpreted by a radiologist.  If there is a significant

## (undated) NOTE — ED AVS SNAPSHOT
Isaías Dietrich   MRN: [id number]    Department:  THE [facility] Emergency Department in [place]   Date of Visit:  9/17/2017           Disclosure     Insurance plans vary and the physician(s) referred by the ER may not be covered by your plan.  Please conta If you have been prescribed any medication(s), please fill your prescription right away and begin taking the medication(s) as directed    If the emergency physician has read X-rays, these will be re-interpreted by a radiologist.  If there is a significant

## (undated) NOTE — LETTER
Date: 8/6/2019    Patient Name: Freya Ferraro          To Whom it may concern: This letter has been written at the patient's request. The above patient was seen at the [facility] for treatment of a medical condition.     The patient may

## (undated) NOTE — ED AVS SNAPSHOT
Ziggy Richard   MRN: [id number]    Department:  THE [facility] Emergency Department in [facility]   Date of Visit:  10/17/2017           Disclosure     Insurance plans vary and the physician(s) referred by the ER may not be covered by your plan.  Please cont If you have been prescribed any medication(s), please fill your prescription right away and begin taking the medication(s) as directed    If the emergency physician has read X-rays, these will be re-interpreted by a radiologist.  If there is a significant

## (undated) NOTE — ED AVS SNAPSHOT
Nick Villa   MRN: [id number]    Department:  [facility] in [place]   Date of Visit:  9/21/2017           Disclosure     Insurance plans vary and the physician(s) referred by the ER may not be covered by your plan.  Please conta If you have been prescribed any medication(s), please fill your prescription right away and begin taking the medication(s) as directed    If the emergency physician has read X-rays, these will be re-interpreted by a radiologist.  If there is a significant

## (undated) NOTE — LETTER
September 21, 2017    Patient: Freya Ferraro   Date of Visit: 9/21/2017       To Whom It May Concern:    Freya Ferraro was seen and treated in our emergency department on 9/21/2017.  She can return to work with these limitations: No lifting over 1